# Patient Record
Sex: FEMALE | Race: OTHER | Employment: STUDENT | URBAN - METROPOLITAN AREA
[De-identification: names, ages, dates, MRNs, and addresses within clinical notes are randomized per-mention and may not be internally consistent; named-entity substitution may affect disease eponyms.]

---

## 2020-01-15 ENCOUNTER — OFFICE VISIT (OUTPATIENT)
Dept: FAMILY MEDICINE CLINIC | Age: 20
End: 2020-01-15

## 2020-01-15 VITALS
HEART RATE: 63 BPM | OXYGEN SATURATION: 98 % | HEIGHT: 64 IN | TEMPERATURE: 98.4 F | DIASTOLIC BLOOD PRESSURE: 62 MMHG | WEIGHT: 186.4 LBS | RESPIRATION RATE: 18 BRPM | BODY MASS INDEX: 31.82 KG/M2 | SYSTOLIC BLOOD PRESSURE: 110 MMHG

## 2020-01-15 DIAGNOSIS — E66.09 CLASS 1 OBESITY DUE TO EXCESS CALORIES WITHOUT SERIOUS COMORBIDITY WITH BODY MASS INDEX (BMI) OF 32.0 TO 32.9 IN ADULT: ICD-10-CM

## 2020-01-15 DIAGNOSIS — E55.9 VITAMIN D DEFICIENCY: ICD-10-CM

## 2020-01-15 DIAGNOSIS — Z23 ENCOUNTER FOR IMMUNIZATION: ICD-10-CM

## 2020-01-15 DIAGNOSIS — E28.2 POLYCYSTIC OVARIES: Primary | ICD-10-CM

## 2020-01-15 PROBLEM — Z80.3 FAMILY HISTORY OF BREAST CANCER: Status: ACTIVE | Noted: 2018-03-02

## 2020-01-15 PROBLEM — E66.9 OBESITY: Status: ACTIVE | Noted: 2017-03-01

## 2020-01-15 PROBLEM — L68.0 HIRSUTISM: Status: ACTIVE | Noted: 2019-04-02

## 2020-01-15 PROBLEM — N92.6 IRREGULAR PERIODS: Status: ACTIVE | Noted: 2017-03-01

## 2020-01-15 PROBLEM — E16.1 HYPERINSULINISM: Status: ACTIVE | Noted: 2019-04-05

## 2020-01-15 PROBLEM — L83 ACANTHOSIS NIGRICANS: Status: ACTIVE | Noted: 2019-04-02

## 2020-01-15 RX ORDER — METFORMIN HYDROCHLORIDE 500 MG/1
TABLET, FILM COATED, EXTENDED RELEASE ORAL
COMMUNITY
End: 2022-02-02

## 2020-01-15 RX ORDER — NORETHINDRONE ACETATE AND ETHINYL ESTRADIOL 1; .02 MG/1; MG/1
TABLET ORAL
COMMUNITY

## 2020-01-15 RX ORDER — ASPIRIN 325 MG
TABLET, DELAYED RELEASE (ENTERIC COATED) ORAL
COMMUNITY

## 2020-01-15 NOTE — PROGRESS NOTES
Mother is long term patient here. Put on metformin for PCOS. Saw nutritionist.  Had labs done. On mod keto diet. Med[facility]weight loss clinic. Does cardio 2 x weekly 60 minutes. Visit Vitals  /62 (BP 1 Location: Right arm, BP Patient Position: Sitting)   Pulse 63   Temp 98.4 °F (36.9 °C) (Oral)   Resp 18   Ht 5' 3.74\" (1.619 m)   Wt 186 lb 6.4 oz (84.6 kg)   LMP 12/11/2019   SpO2 98%   BMI 32.26 kg/m²     Patient alert and cooperative. Reviewed above. Assessment:  1. Here for get acquainted. 2. Begun on Metformin for PCOS. 3. On vitamin D for replacement, on BCP, multivitamin. 4. GYN is up to date. Plan:  1. Get shot records and recently done labs. 2. Discussed routine health screening, physical timing. 3. Flu shot per request.  4. Follow otherwise here prn. TOTAL FACE TO FACE TIME OF 10 MINUTES SPENT WITH PATIENT. GREATER THAN 50% OF TIME WAS SPENT IN COUNSELING AND/OR COORDINATION OF CARE. SEE ASSESSMENT AND PLAN FOR DETAILS.

## 2020-01-15 NOTE — PROGRESS NOTES
Lisbeth Baldwin is a 22 y.o. female  HIPAA verified by two patient identifiers. Health Maintenance Due   Topic    HPV Age 9Y-34Y (3 - Female 2-dose series)    DTaP/Tdap/Td series (1 - Tdap)    Influenza Age 5 to Adult      Chief Complaint   Patient presents with   [facility] Patient     Visit Vitals  /62 (BP 1 Location: Right arm, BP Patient Position: Sitting)   Pulse 63   Temp 98.4 °F (36.9 °C) (Oral)   Resp 18   Ht 5' 3.74\" (1.619 m)   Wt 186 lb 6.4 oz (84.6 kg)   LMP 12/11/2019   SpO2 98%   BMI 32.26 kg/m²       Pain Scale: 0 - No pain/10  Pain Location:   1. Have you been to the ER, urgent care clinic since your last visit? Hospitalized since your last visit? No    2. Have you seen or consulted any other health care providers outside of the [address] [place] [address] since your last visit? Include any pap smears or colon screening.  No

## 2020-10-20 ENCOUNTER — VIRTUAL VISIT (OUTPATIENT)
Dept: FAMILY MEDICINE CLINIC | Age: 20
End: 2020-10-20
Payer: COMMERCIAL

## 2020-10-20 DIAGNOSIS — H69.81 ETD (EUSTACHIAN TUBE DYSFUNCTION), RIGHT: ICD-10-CM

## 2020-10-20 DIAGNOSIS — J30.89 SEASONAL ALLERGIC RHINITIS DUE TO OTHER ALLERGIC TRIGGER: ICD-10-CM

## 2020-10-20 DIAGNOSIS — H92.01 OTALGIA OF RIGHT EAR: ICD-10-CM

## 2020-10-20 DIAGNOSIS — R63.4 WEIGHT LOSS: ICD-10-CM

## 2020-10-20 DIAGNOSIS — H91.8X1 OTHER SPECIFIED HEARING LOSS OF RIGHT EAR, UNSPECIFIED HEARING STATUS ON CONTRALATERAL SIDE: Primary | ICD-10-CM

## 2020-10-20 PROCEDURE — 99213 OFFICE O/P EST LOW 20 MIN: CPT | Performed by: FAMILY MEDICINE

## 2020-10-20 RX ORDER — CHOLECALCIFEROL (VITAMIN D3) 125 MCG
CAPSULE ORAL
COMMUNITY

## 2020-10-20 NOTE — PATIENT INSTRUCTIONS
Allergies: Care Instructions Your Care Instructions Allergies occur when your body's defense system (immune system) overreacts to certain substances. The immune system treats a harmless substance as if it were a harmful germ or virus. Many things can make this happen. These include pollens, medicine, food, dust, animal dander, and mold. Allergies can be mild or severe. Mild allergies can be managed with home treatment. But medicine may be needed to prevent problems. Managing your allergies is an important part of staying healthy. Your doctor may suggest that you have allergy testing to help find out what is causing your allergies. Severe allergies can cause reactions that affect your whole body (anaphylactic reactions). Your doctor may prescribe a shot of epinephrine to carry with you in case you have a severe reaction. Learn how to give yourself the shot and keep it with you at all times. Make sure it is not . Follow-up care is a key part of your treatment and safety. Be sure to make and go to all appointments, and call your doctor if you are having problems. It's also a good idea to know your test results and keep a list of the medicines you take. How can you care for yourself at home? · If you have been told by your doctor that dust or dust mites are causing your allergy, decrease the dust around your bed: 
? Wash sheets, pillowcases, and other bedding in hot water every week. ? Use dust-proof covers for pillows, duvets, and mattresses. Avoid plastic covers because they tear easily and do not \"breathe. \" Wash as instructed on the label. ? Do not use any blankets and pillows that you do not need. ? Use blankets that you can wash in your washing machine. ? Consider removing drapes and carpets, which attract and hold dust, from your bedroom. · If you are allergic to house dust and mites, do not use home humidifiers. Your doctor can suggest ways you can control dust and mites. · Look for signs of cockroaches. Cockroaches cause allergic reactions. Use cockroach baits to get rid of them. Then, clean your home well. Cockroaches like areas where grocery bags, newspapers, empty bottles, or cardboard boxes are stored. Do not keep these inside your home, and keep trash and food containers sealed. Seal off any spots where cockroaches might enter your home. · If you are allergic to mold, get rid of furniture, rugs, and drapes that smell musty. Check for mold in the bathroom. · If you are allergic to outdoor pollen or mold spores, use air-conditioning. Change or clean all filters every month. Keep windows closed. · If you are allergic to pollen, stay inside when pollen counts are high. Use a vacuum  with a HEPA filter or a double-thickness filter at least two times each week. · Stay inside when air pollution is bad. Avoid paint fumes, perfumes, and other strong odors. · Avoid conditions that make your allergies worse. Stay away from smoke. Do not smoke or let anyone else smoke in your house. Do not use fireplaces or wood-burning stoves. · If you are allergic to your pets, change the air filter in your furnace every month. Use high-efficiency filters. · If you are allergic to pet dander, keep pets outside or out of your bedroom. Old carpet and cloth furniture can hold a lot of animal dander. You may need to replace them. When should you call for help? Give an epinephrine shot if: 
  · You think you are having a severe allergic reaction.  
  · You have symptoms in more than one body area, such as mild nausea and an itchy mouth. After giving an epinephrine shot call 911, even if you feel better. Call 911 if: 
  · You have symptoms of a severe allergic reaction. These may include: 
? Sudden raised, red areas (hives) all over your body. ? Swelling of the throat, mouth, lips, or tongue. ? Trouble breathing. ? Passing out (losing consciousness).  Or you may feel very lightheaded or suddenly feel weak, confused, or restless.  
  · You have been given an epinephrine shot, even if you feel better. Call your doctor now or seek immediate medical care if: 
  · You have symptoms of an allergic reaction, such as: ? A rash or hives (raised, red areas on the skin). ? Itching. ? Swelling. ? Belly pain, nausea, or vomiting. Watch closely for changes in your health, and be sure to contact your doctor if: 
  · You do not get better as expected. Where can you learn more? Go to http://www.gray.com/ Enter P957 in the search box to learn more about \"Allergies: Care Instructions. \" Current as of: June 29, 2020               Content Version: 12.6 © 6154-7080 [employer]. Care instructions adapted under license by [employer] (which disclaims liability or warranty for this information). If you have questions about a medical condition or this instruction, always ask your healthcare professional. [place] [address] any warranty or liability for your use of this information. Saline Nasal Washes: Care Instructions Your Care Instructions Saline nasal washes help keep the nasal passages open by washing out thick or dried mucus. This simple remedy can help relieve symptoms of allergies, sinusitis, and colds. It also can make the nose feel more comfortable by keeping the mucous membranes moist. You may notice a little burning sensation in your nose the first few times you use the solution, but this usually gets better in a few days. Follow-up care is a key part of your treatment and safety. Be sure to make and go to all appointments, and call your doctor if you are having problems. It's also a good idea to know your test results and keep a list of the medicines you take. How can you care for yourself at home?  
· You can buy premixed saline solution in a squeeze bottle or other sinus rinse products at a drugstore. Read and follow the instructions on the label. · You also can make your own saline solution by adding 1 teaspoon of salt and 1 teaspoon of baking soda to 2 cups of distilled water. · If you use a homemade solution, pour a small amount into a clean bowl. Using a rubber bulb syringe, squeeze the syringe and place the tip in the salt water. Pull a small amount of the salt water into the syringe by relaxing your hand. · Sit down with your head tilted slightly back. Do not lie down. Put the tip of the bulb syringe or the squeeze bottle a little way into one of your nostrils. Gently drip or squirt a few drops into the nostril. Repeat with the other nostril. Some sneezing and gagging are normal at first. 
· Gently blow your nose. · Wipe the syringe or bottle tip clean after each use. · Repeat this 2 or 3 times a day. · Use nasal washes gently if you have nosebleeds often. When should you call for help? Watch closely for changes in your health, and be sure to contact your doctor if: 
  · You often get nosebleeds.  
  · You have problems doing the nasal washes. Where can you learn more? Go to http://inés-mohit.info/ Enter [id number] in the search box to learn more about \"Saline Nasal Washes: Care Instructions. \" Current as of: April 15, 2020               Content Version: 12.6 © 2188-0709 [employer]. Care instructions adapted under license by [employer] (which disclaims liability or warranty for this information). If you have questions about a medical condition or this instruction, always ask your healthcare professional. [place] [address] any warranty or liability for your use of this information. Eustachian Tube Problems: Care Instructions Your Care Instructions The eustachian (say \"you-STAY-shee-un\") tubes run between the inside of the ears and the throat. They keep air pressure stable in the ears.  If your eustachian tubes become blocked, the air pressure in your ears changes. The fluids from a cold can clog eustachian tubes, causing pain in the ears. A quick change in air pressure can cause eustachian tubes to close up. This might happen when an airplane changes altitude or when a  goes up or down underwater. Eustachian tube problems often clear up on their own or after antibiotic treatment. If your tubes continue to be blocked, you may need surgery. Follow-up care is a key part of your treatment and safety. Be sure to make and go to all appointments, and call your doctor if you are having problems. It's also a good idea to know your test results and keep a list of the medicines you take. How can you care for yourself at home? · To ease ear pain, apply a warm washcloth or a heating pad set on low. There may be some drainage from the ear when the heat melts earwax. Put a cloth between the heat source and your skin. Do not use a heating pad with children. · If your doctor prescribed antibiotics, take them as directed. Do not stop taking them just because you feel better. You need to take the full course of antibiotics. · Your doctor may recommend over-the-counter medicine. Be safe with medicines. Oral or nasal decongestants may relieve ear pain. Avoid decongestants that are combined with antihistamines, which tend to cause more blockage. But if allergies seem to be the problem, your doctor may recommend a combination. Be careful with cough and cold medicines. Don't give them to children younger than 6, because they don't work for children that age and can even be harmful. For children 6 and older, always follow all the instructions carefully. Make sure you know how much medicine to give and how long to use it. And use the dosing device if one is included. When should you call for help? Call your doctor now or seek immediate medical care if: 
  · You develop sudden, complete hearing loss.   · You have severe pain or feel dizzy.  
  · You have new or increasing pus or blood draining from your ear.  
  · You have redness, swelling, or pain around or behind the ear. Watch closely for changes in your health, and be sure to contact your doctor if: 
  · You do not get better after 2 weeks.  
  · You have any new symptoms, such as itching or a feeling of fullness in the ear. Where can you learn more? Go to http://www.gray.com/ Enter Y822 in the search box to learn more about \"Eustachian Tube Problems: Care Instructions. \" Current as of: April 15, 2020               Content Version: 12.6 © 7859-5820 "[employer]. Care instructions adapted under license by [employer] (which disclaims liability or warranty for this information). If you have questions about a medical condition or this instruction, always ask your healthcare professional. [address] any warranty or liability for your use of this information. Advise patient to start taking Over The Counter allergy medication (Allegra, Zyrtec, Claritin, Xyzal or Alavert) daily. If you have itchy, watery eyes you can try OTC Zaditor or Zyrtec Allergy Eye drops. If you have a stuffy nose, please try OTC Flonase, Flonase Sensimist, Rhinocort, or Nasacort AQ 2 squirts up each nostril once a day (adults) or 1 squirt up each nostril once a day (children). Use allergy free, dye free, fragrance free products on your body and hair. After being outdoors, brush hair vigorously, wash face and arms, rinse nostrils with a nasal saline spray and consider changing your clothing. Dust furniture frequently and wear a mask while doing it. Vacuum floors weekly. Remove stuffed animals or extra pillows from the bed. Clean bedding in hot water weekly. Sometimes allergies can be so severe that 2 nasal sprays and 2 pills may be needed to control symptoms.

## 2020-10-20 NOTE — PROGRESS NOTES
Chief Complaint   Patient presents with    Ear Pain     right,muffled sound with sharp pain     1. Have you been to the ER, urgent care clinic since your last visit? Hospitalized since your last visit?no    2. Have you seen or consulted any other health care providers outside of the [address] [place] [address] since your last visit? Include any pap smears or colon screening.  no

## 2020-10-20 NOTE — PROGRESS NOTES
VIRTUAL VISIT      Pursuant to the emergency declaration under the [employer] and [facility], 1135 waiver authority and the [employer] and [employer] Act, this Virtual Visit was conducted, with patient's consent, to reduce the patient's risk of exposure to COVID-19 and provide continuity of care for an established patient. Services were provided through a video synchronous discussion virtually to substitute for in-person appointment. Consent:  She and/or her healthcare decision maker is aware that this patient-initiated Telehealth encounter is a billable service, with coverage as determined by her insurance carrier. She is aware that she may receive a bill and has provided verbal consent to proceed: Yes    HISTORY OF PRESENT ILLNESS  Ke Almeida is a 23 y.o. female presents with Ear Pain (right,muffled sound with sharp pain)      Agree with nurse note. Pt's PCP is Duyen Ambrose NP who is seeing me today as a courtesy for acute care. Pt with weight loss. She takes metformin 500 mg q7d for hyperinsulinism; tolerating well. Pt is down 21 lbs since 1/15/2020. She abides by the keto diet and walks daily. Pt with seasonal allergic rhinitis and ETD complains of sharp R ear pain that has occurred 3 times in the last month, occasional ear popping, sensitivity to sounds and vibrations, and occasional muffled hearing loss. No known head trauma. The pain lasts for 10 seconds at a time. She uses airpod headphones frequently. She takes Zyrtec for allergies with moderate relief. Pt denies pain with ear tugging, fever, chills, headaches, facial pain, cough, wheezing, sneezing, chest pain or tightness, SOB, nasal discharge, or post nasal drainage. Pt notes previously having similar sxs that lasted 1 month before sxs resolved.     Written by shae Schwab, as dictated by Dr. Jessica Barnett DO.    ROS    Review of Systems negative except as noted above in HPI. ALLERGIES:  No Known Allergies    CURRENT MEDICATIONS:    Outpatient Medications Marked as Taking for the 10/20/20 encounter (Virtual Visit) with Henrietta Emerson DO   Medication Sig Dispense Refill    cholecalciferol, vitamin D3, (Vitamin D3) 50 mcg (2,000 unit) tab Take  by mouth.  norethindrone-ethinyl estradiol (JUNEL 1/20, 21,) 1-20 mg-mcg tab Junel 1/20 (21) 1 mg-20 mcg tablet   TAKE 1 TABLET BY MOUTH EVERY DAY AT THE SAME TIME      multivit with min-folic acid (ADULT MULTIVITAMIN GUMMIES) 200 mcg chew Take  by mouth. PAST MEDICAL HISTORY:  No past medical history on file. PAST SURGICAL HISTORY:  No past surgical history on file. FAMILY HISTORY:    Family History   Problem Relation Age of Onset    Diabetes Father     Diabetes Paternal Uncle     Diabetes Maternal Grandmother     Arthritis-osteo Maternal Grandmother     Cancer Maternal Grandfather        SOCIAL HISTORY:    Social History     Socioeconomic History    Marital status: SINGLE     Spouse name: Not on file    Number of children: Not on file    Years of education: Not on file    Highest education level: Not on file   Tobacco Use    Smoking status: Never Smoker    Smokeless tobacco: Never Used   Substance and Sexual Activity    Drug use: Never    Sexual activity: Yes     Partners: Male       IMMUNIZATIONS:    Immunization History   Administered Date(s) Administered    Influenza Vaccine (Quad) PF (>6 Mo Flulaval, Fluarix, and >3 Yrs [address] [place] [address]) 01/15/2020         PHYSICAL EXAMINATION (PER VISUAL INSPECTION AND INSTRUCTIONS GIVEN TO PATIENT TO PERFORM)    Due to this being a TeleHealth encounter (During [facility]- public health emergency), evaluation of the following organ systems was limited to:     Vital Signs  There were no vitals taken for this visit. Weight Metrics 1/15/2020   Weight 186 lb 6.4 oz   BMI 32.26 kg/m2       General appearance - Well nourished. Well appearing.   Well developed. No acute distress. Obese. Head - Normocephalic. Atraumatic. Eyes - Extraocular eye movements intact. Sclera anicteric. Mildly injected sclera. Ears - Hearing is grossly normal bilaterally. Nose - normal and patent. No discharge. Chest - No visualized signs of difficulty breathing or respiratory distress. Neurological - awake, alert and oriented to person, place, and time and event. Cranial nerves II through XII intact. Clear speech. Musculoskeletal - Intact x 4 extremities. No pain with movement. Psychological -   normal behavior, dress and thought processes. Good insight. Good eye contact. Normal affect. Appropriate mood. Normal speech. ASSESSMENT and PLAN      ICD-10-CM ICD-9-CM    1. Other specified hearing loss of right ear, unspecified hearing status on contralateral side  H91.8X1 389.8 REFERRAL TO ENT-OTOLARYNGOLOGY   2. Seasonal allergic rhinitis due to other allergic trigger  J30.89 477.8    3. Otalgia of right ear  H92.01 388.70 REFERRAL TO ENT-OTOLARYNGOLOGY   4. ETD (Eustachian tube dysfunction), right  H69.81 381.81    5. Weight loss  R63.4 783.21     21# due to keto diet and increased walking       Discussed the patient's BMI with her. The BMI follow up plan is as follows: I have counseled this patient on diet and exercise regimens. Decrease carbohydrates (white foods, sweet foods, sweet drinks and alcohol), increase green leafy vegetables and protein (lean meats and beans) with each meal.  Avoid fried foods. Eat 3-5 small meals daily. Do not skip meals. Increase water intake. Increase physical activity to 30 minutes daily for health benefit or 60 minutes daily to prevent weight regain, as tolerated. Get 7-8 hours uninterrupted sleep nightly. Chart reviewed and updated. Continue current medications and care. Recommend pt use steroid nasal spray, such as Nasocort, Rhinocort, or Flonase Sensimist. Recommend OTC allergy medication daily. Recommend using an OTC ear wax removal kit  Referrals given; patient urged to keep appointments with specialists. ENT-Otolaryngology  Contact information for Dr. Renée Lyons given to pt today. Counseled patient on health concerns:  R ear pain  Offered empathy, support, legitimation, prayers, partnership to patient. Praised patient for progress. Encouraged the pt to sign up for MyChart to be able to view results and send me any questions or concerns prior to the next visit where we will go over results in detail. Patient was offered a choice/choices in the treatment plan today. Patient expresses understanding of the plan and agrees with recommendations. 13 mins spent face to face with patient and more than 50% of this time spent in counseling and coordinating care. Written by shae Lozano, as dictated by Dr. Chetan Michelle DO. Documentation True and Accepted by Nicola Hogan.  Christal Chavez.

## 2021-01-21 ENCOUNTER — TELEPHONE (OUTPATIENT)
Dept: FAMILY MEDICINE CLINIC | Age: 21
End: 2021-01-21

## 2021-01-21 NOTE — TELEPHONE ENCOUNTER
Informed pt per Marie VILLARREAL that she needs to be evaluated at an urgent care setting, to have her bs and bp and right ear evaluated. Informed pt to have someone drive her there and continue to drink plenty of fluids     Pt verbalized understanding of information discussed w/ no further questions at this time.

## 2021-01-21 NOTE — TELEPHONE ENCOUNTER
Pt states she passed out 20 mins due to a possible anxiety or panic attack. Asked pt about her diet and fluid intake and other sx . Pt states she did not eat yesterday and has not eaten or drink anything this morning. She states eight ear has been sensitive and sometimes vibrates after a workout and pops. Pt state she has been on the Keto diet and has PCOS and wonders if that could have contributed to her passing out     Informed pt that these all could have played a role in affecting her equilibrium, hormonal balance and electrolytes. Informed pt to rest and to see if she can check her bp, pulse and blood sugar levels, pt did not have any of the following at hand.      Informed pt to rest and eat and drink plenty fluids and will call back with further instructions

## 2021-02-24 ENCOUNTER — OFFICE VISIT (OUTPATIENT)
Dept: NEUROLOGY | Age: 21
End: 2021-02-24
Payer: COMMERCIAL

## 2021-02-24 VITALS
BODY MASS INDEX: 30.48 KG/M2 | SYSTOLIC BLOOD PRESSURE: 112 MMHG | WEIGHT: 172 LBS | OXYGEN SATURATION: 100 % | HEART RATE: 61 BPM | TEMPERATURE: 96.8 F | DIASTOLIC BLOOD PRESSURE: 60 MMHG | HEIGHT: 63 IN

## 2021-02-24 DIAGNOSIS — M79.2 NEURITIS: Primary | ICD-10-CM

## 2021-02-24 DIAGNOSIS — R20.2 PARESTHESIA: ICD-10-CM

## 2021-02-24 PROCEDURE — 99205 OFFICE O/P NEW HI 60 MIN: CPT | Performed by: PSYCHIATRY & NEUROLOGY

## 2021-02-24 NOTE — PROGRESS NOTES
1/22 blood drawn numbness left fore arm after blood draw  Back of the arm  Finish up the steroid  Neurology Consult Note      HISTORY PROVIDED BY: patient    Chief Complaint:   Chief Complaint   Patient presents with    New Patient      Subjective:    Saroj Berrios is a 23 y.o. right handed female who presents in consultation for numbness and tingling left forearm. Patient was accompanied by her father to the visit. This is a 22-year-old right-handed female with no apparent past medical history except for fainting spell who presented to the clinic to evaluate numbness, tingling sensation and pain left forearm after phlebotomy. Patient apparently had blackout spell on or about 1/22/2021, [facility], according to patient, when the phlebotomist stuck her with needle to get  blood she felt sensation that went down to left forearm, left forearm became tingly, painful, was unable to move the fingers or make a fist.  She said she went back and was given medication which did not help, subsequently, she was given methylprednisolone which according to patient, it has been helpful. The sensation has decreased, pain has also decreased, she is able to move the left hand and forearm better. She is not dropping things.   Review of Systems - General ROS: negative for - fatigue or sleep disturbance  Psychological ROS: negative for - anxiety or sleep disturbances  Ophthalmic ROS: negative for - blurry vision or decreased vision  ENT ROS: negative for - headaches, tinnitus, vertigo or visual changes  Allergy and Immunology ROS: negative  Hematological and Lymphatic ROS: negative  Endocrine ROS: negative  Respiratory ROS: no cough, shortness of breath, or wheezing  Cardiovascular ROS: no chest pain or dyspnea on exertion  Gastrointestinal ROS: no abdominal pain, change in bowel habits, or black or bloody stools  Genito-Urinary ROS: no dysuria, trouble voiding, or hematuria  Musculoskeletal ROS: positive for - muscular weakness and pain in arm - left, hand - left and wrist - left  Neurological ROS: negative for - dizziness or gait disturbance. Positive for-numbness and tingling sensation left arm l  Dermatological ROS: negative    History reviewed. No pertinent past medical history. History reviewed. No pertinent surgical history.    Social History     Socioeconomic History    Marital status: SINGLE     Spouse name: Not on file    Number of children: Not on file    Years of education: Not on file    Highest education level: Not on file   Occupational History    Not on file   Social Needs    Financial resource strain: Not on file    Food insecurity     Worry: Not on file     Inability: Not on file    Transportation needs     Medical: Not on file     Non-medical: Not on file   Tobacco Use    Smoking status: Never Smoker    Smokeless tobacco: Never Used   Substance and Sexual Activity    Alcohol use: Not on file    Drug use: Never    Sexual activity: Yes     Partners: Male   Lifestyle    Physical activity     Days per week: Not on file     Minutes per session: Not on file    Stress: Not on file   Relationships    Social connections     Talks on phone: Not on file     Gets together: Not on file     Attends Anabaptism service: Not on file     Active member of club or organization: Not on file     Attends meetings of clubs or organizations: Not on file     Relationship status: Not on file    Intimate partner violence     Fear of current or ex partner: Not on file     Emotionally abused: Not on file     Physically abused: Not on file     Forced sexual activity: Not on file   Other Topics Concern    Not on file   Social History Narrative    Not on file     Family History   Problem Relation Age of Onset    Diabetes Father     Diabetes Paternal Uncle     Diabetes Maternal Grandmother     Arthritis-osteo Maternal Grandmother     Cancer Maternal Grandfather          Objective:   ROS  As per HPI  No Known Allergies     Meds:  Outpatient Medications Prior to Visit   Medication Sig Dispense Refill    cholecalciferol, vitamin D3, (Vitamin D3) 50 mcg (2,000 unit) tab Take  by mouth.  norethindrone-ethinyl estradiol (JUNEL 1/20, 21,) 1-20 mg-mcg tab Junel 1/20 (21) 1 mg-20 mcg tablet   TAKE 1 TABLET BY MOUTH EVERY DAY AT THE SAME TIME      multivit with min-folic acid (ADULT MULTIVITAMIN GUMMIES) 200 mcg chew Take  by mouth.  cholecalciferol (VITAMIN D3) (50,000 UNITS /1250 MCG) capsule cholecalciferol (vitamin D3) 1,250 mcg (50,000 unit) capsule   Take 1 capsule every week by oral route.  metFORMIN (GLUMETZA ER) 500 mg TG24 24 hour tablet metformin  mg tablet,extended release 24 hr   one tablet after dinner x 1 week. If tolerating add second dose after breakfast.       No facility-administered medications prior to visit. Imaging:  MRI Results (most recent):  No results found for this or any previous visit. CT Results (most recent):  No results found for this or any previous visit. Reviewed records in [facility] tab today    Lab Review   No results found for this or any previous visit. Exam:  Visit Vitals  /60   Pulse 61   Temp 96.8 °F (36 °C)   Ht 5' 3\" (1.6 m)   Wt 172 lb (78 kg)   SpO2 100%   BMI 30.47 kg/m²     General:  Alert, cooperative, no distress. Head:  Normocephalic, without obvious abnormality, atraumatic. Respiratory:  Heart:   Non labored breathing  Regular rate and rhythm, no murmurs   Neck:   2+ carotids, no bruits   Extremities: Warm, no cyanosis or edema. Pulses: 2+ radial pulses. Neurologic:  MS: Alert and oriented x 4, speech intact. Language intact, able to name, repeat, and follow all commands. Attention and fund of knowledge appropriate. Recent and remote memory intact.   Cranial Nerves:  II: visual fields Full to confrontation   II: pupils Equal, round, reactive to light   II: optic disc No papilledema   III,VII: ptosis none   III,IV,VI: extraocular muscles  EOMI, no nystagmus or diplopia   V: facial light touch sensation  normal   VII: facial muscle function   symmetric   VIII: hearing intact   IX: soft palate elevation  normal   XI: trapezius strength  5/5   XI: sternocleidomastoid strength 5/5   XII: tongue  Midline     Motor: normal bulk and tone, no tremor              Strength: 4+/5 left hand, [place] 5 rest of the extremities , no PD  Sensory: Equivocal decreased sensation to LT, PP, temperature and vibration left forearm  Coordination: FTN and HTS intact, CAM intact,Romberg negative  Gait: normal gait, able to heel, toe, and tandem walk  Reflexes: 2+ symmetric. Plantar: Withdrawn           Assessment/Plan   This is a 23-year-old right-handed female with no apparent past medical history who is being evaluated for numbness and tingling sensation, pain left forearm and hand status post phlebotomy. ICD-10-CM ICD-9-CM    1. Neuritis  M79.2 729.2    2. Paresthesia  R20.2 782.0    Plan:  Patient to continue and finish up the steroid. At this time, no other medications recommended as patient is improving. If symptoms do not resolve by next 2 months, patient advised to come back to the clinic for nerve conduction study and further evaluation. With incident less than 6 weeks, nerve conduction study may not yield any meaningful. Results    Follow-up and Dispositions    · Return if symptoms worsen or fail to improve. Thank you very much for this consultation.    Signed:  Justine Wadsworth MD  2/24/2021

## 2022-01-05 ENCOUNTER — OFFICE VISIT (OUTPATIENT)
Dept: URGENT CARE | Age: 22
End: 2022-01-05
Payer: COMMERCIAL

## 2022-01-05 VITALS — TEMPERATURE: 98.2 F | HEART RATE: 84 BPM | RESPIRATION RATE: 18 BRPM | OXYGEN SATURATION: 98 %

## 2022-01-05 DIAGNOSIS — Z20.822 SUSPECTED COVID-19 VIRUS INFECTION: Primary | ICD-10-CM

## 2022-01-05 PROCEDURE — S9083 URGENT CARE CENTER GLOBAL: HCPCS | Performed by: FAMILY MEDICINE

## 2022-01-05 NOTE — PROGRESS NOTES
This patient was seen at [address] [place] [address] Urgent Care while in their vehicle due to COVID-19 pandemic with PPE and focused examination in order to decrease community viral transmission. The patient/guardian gave verbal consent to treat. The history is provided by the patient. Sore Throat   The history is provided by the patient. This is a new problem. The current episode started 12 to 24 hours ago. The problem has not changed since onset. There has been no fever. Associated symptoms include congestion. She has had no exposure to strep or mono. She has tried nothing for the symptoms. History reviewed. No pertinent past medical history. History reviewed. No pertinent surgical history. Family History   Problem Relation Age of Onset    Diabetes Father     Diabetes Paternal Uncle     Diabetes Maternal Grandmother     OSTEOARTHRITIS Maternal Grandmother     Cancer Maternal Grandfather         Social History     Socioeconomic History    Marital status: SINGLE     Spouse name: Not on file    Number of children: Not on file    Years of education: Not on file    Highest education level: Not on file   Occupational History    Not on file   Tobacco Use    Smoking status: Never Smoker    Smokeless tobacco: Never Used   Substance and Sexual Activity    Alcohol use: Not on file    Drug use: Never    Sexual activity: Yes     Partners: Male   Other Topics Concern    Not on file   Social History Narrative    Not on file     Social Determinants of Health     Financial Resource Strain:     Difficulty of Paying Living Expenses: Not on file   Food Insecurity:     Worried About Running Out of Food in the Last Year: Not on file    Shanelle of Food in the Last Year: Not on file   Transportation Needs:     Lack of Transportation (Medical): Not on file    Lack of Transportation (Non-Medical):  Not on file   Physical Activity:     Days of Exercise per Week: Not on file    Minutes of Exercise per Session: Not on file   Stress:     Feeling of Stress : Not on file   Social Connections:     Frequency of Communication with Friends and Family: Not on file    Frequency of Social Gatherings with Friends and Family: Not on file    Attends Pentecostal Services: Not on file    Active Member of Clubs or Organizations: Not on file    Attends Club or Organization Meetings: Not on file    Marital Status: Not on file   Intimate Partner Violence:     Fear of Current or Ex-Partner: Not on file    Emotionally Abused: Not on file    Physically Abused: Not on file    Sexually Abused: Not on file   Housing Stability:     Unable to Pay for Housing in the Last Year: Not on file    Number of [place] in the Last Year: Not on file    Unstable Housing in the Last Year: Not on file                ALLERGIES: Patient has no known allergies. Review of Systems   HENT: Positive for congestion and sore throat. All other systems reviewed and are negative. Vitals:    01/05/22 1308   Pulse: 84   Resp: 18   Temp: 98.2 °F (36.8 °C)   SpO2: 98%       Physical Exam  Vitals and nursing note reviewed. Constitutional:       General: She is not in acute distress. Appearance: She is not ill-appearing. Pulmonary:      Effort: Pulmonary effort is normal. No respiratory distress. Breath sounds: No wheezing. Genitourinary:     Rectum: Normal.         MDM    Procedures      ICD-10-CM ICD-9-CM    1. Suspected COVID-19 virus infection  Z20.822 V01.79 NOVEL CORONAVIRUS (COVID-19)     No orders of the defined types were placed in this encounter. No results found for any visits on 01/05/22. The patients condition was discussed with the patient and they understand. The patient is to follow up with primary care doctor. If signs and symptoms become worse the pt is to go to the ER. The patient is to take medications as prescribed.

## 2022-01-08 LAB
SARS-COV-2, NAA 2 DAY TAT: NORMAL
SARS-COV-2, NAA: DETECTED

## 2022-02-02 ENCOUNTER — OFFICE VISIT (OUTPATIENT)
Dept: FAMILY MEDICINE CLINIC | Age: 22
End: 2022-02-02
Payer: COMMERCIAL

## 2022-02-02 VITALS
HEART RATE: 71 BPM | DIASTOLIC BLOOD PRESSURE: 74 MMHG | HEIGHT: 63 IN | BODY MASS INDEX: 34.2 KG/M2 | SYSTOLIC BLOOD PRESSURE: 127 MMHG | RESPIRATION RATE: 18 BRPM | WEIGHT: 193 LBS | OXYGEN SATURATION: 99 % | TEMPERATURE: 97.7 F

## 2022-02-02 DIAGNOSIS — Z00.00 ANNUAL PHYSICAL EXAM: Primary | ICD-10-CM

## 2022-02-02 DIAGNOSIS — Z13.220 SCREENING CHOLESTEROL LEVEL: ICD-10-CM

## 2022-02-02 DIAGNOSIS — Z11.59 NEED FOR HEPATITIS C SCREENING TEST: ICD-10-CM

## 2022-02-02 DIAGNOSIS — E66.9 OBESITY (BMI 30.0-34.9): ICD-10-CM

## 2022-02-02 LAB
ALBUMIN SERPL-MCNC: 3.9 G/DL (ref 3.5–5)
ALBUMIN/GLOB SERPL: 1.1 {RATIO} (ref 1.1–2.2)
ALP SERPL-CCNC: 50 U/L (ref 45–117)
ALT SERPL-CCNC: 21 U/L (ref 12–78)
ANION GAP SERPL CALC-SCNC: 3 MMOL/L (ref 5–15)
AST SERPL-CCNC: 13 U/L (ref 15–37)
BILIRUB SERPL-MCNC: 0.2 MG/DL (ref 0.2–1)
BUN SERPL-MCNC: 12 MG/DL (ref 6–20)
BUN/CREAT SERPL: 16 (ref 12–20)
CALCIUM SERPL-MCNC: 9.5 MG/DL (ref 8.5–10.1)
CHLORIDE SERPL-SCNC: 107 MMOL/L (ref 97–108)
CHOLEST SERPL-MCNC: 145 MG/DL
CO2 SERPL-SCNC: 26 MMOL/L (ref 21–32)
CREAT SERPL-MCNC: 0.75 MG/DL (ref 0.55–1.02)
ERYTHROCYTE [DISTWIDTH] IN BLOOD BY AUTOMATED COUNT: 13 % (ref 11.5–14.5)
GLOBULIN SER CALC-MCNC: 3.6 G/DL (ref 2–4)
GLUCOSE SERPL-MCNC: 98 MG/DL (ref 65–100)
HCT VFR BLD AUTO: 40.6 % (ref 35–47)
HCV AB SERPL QL IA: NONREACTIVE
HDLC SERPL-MCNC: 58 MG/DL
HDLC SERPL: 2.5 {RATIO} (ref 0–5)
HGB BLD-MCNC: 13.4 G/DL (ref 11.5–16)
LDLC SERPL CALC-MCNC: 68.4 MG/DL (ref 0–100)
MCH RBC QN AUTO: 28.9 PG (ref 26–34)
MCHC RBC AUTO-ENTMCNC: 33 G/DL (ref 30–36.5)
MCV RBC AUTO: 87.5 FL (ref 80–99)
NRBC # BLD: 0 K/UL (ref 0–0.01)
NRBC BLD-RTO: 0 PER 100 WBC
PLATELET # BLD AUTO: 269 K/UL (ref 150–400)
PMV BLD AUTO: 9.9 FL (ref 8.9–12.9)
POTASSIUM SERPL-SCNC: 4.2 MMOL/L (ref 3.5–5.1)
PROT SERPL-MCNC: 7.5 G/DL (ref 6.4–8.2)
RBC # BLD AUTO: 4.64 M/UL (ref 3.8–5.2)
SODIUM SERPL-SCNC: 136 MMOL/L (ref 136–145)
TRIGL SERPL-MCNC: 93 MG/DL (ref ?–150)
VLDLC SERPL CALC-MCNC: 18.6 MG/DL
WBC # BLD AUTO: 7.1 K/UL (ref 3.6–11)

## 2022-02-02 PROCEDURE — 99395 PREV VISIT EST AGE 18-39: CPT | Performed by: STUDENT IN AN ORGANIZED HEALTH CARE EDUCATION/TRAINING PROGRAM

## 2022-02-02 RX ORDER — HYDROXYZINE 25 MG/1
TABLET, FILM COATED ORAL
COMMUNITY

## 2022-02-02 NOTE — PROGRESS NOTES
[facility]  [address] [place], [address] [place] [address]  [phone]    HPI:  Felicia Rodriguez is a 19 y.o. female. Presenting for her annual checkup. Previously following at [facility]    Acute/chronic concerns:   None    Other Health Habits and social history:  Smoking history: no  Alcohol history: socially   Occupation: senior in 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 Plans to go to grad school   Marital status:single and no children. Mother is Keyla Speaker who is also my patient. OB/GYN Hx: Follows with OB. On birth control    Health Maintenance:    Health Maintenance Due   Topic Date Due    Hepatitis C Screening  Never done    HPV Age 9Y-34Y (2 - 3-dose series) 03/30/2018    DTaP/Tdap/Td series (1 - Tdap) Never done    Pap Smear  Never done    Flu Vaccine (1) 09/01/2021       Current Outpatient Medications   Medication Sig Dispense Refill    norethindrone-ethinyl estradiol (JUNEL 1/20, 21,) 1-20 mg-mcg tab Junel 1/20 (21) 1 mg-20 mcg tablet   TAKE 1 TABLET BY MOUTH EVERY DAY AT THE SAME TIME      multivit with min-folic acid (ADULT MULTIVITAMIN GUMMIES) 200 mcg chew Take  by mouth.  hydrOXYzine HCL (ATARAX) 25 mg tablet hydroxyzine HCl 25 mg tablet   Take 1 tablet 3 times a day by oral route.  cholecalciferol, vitamin D3, (Vitamin D3) 50 mcg (2,000 unit) tab Take  by mouth. (Patient not taking: Reported on 2/2/2022)      cholecalciferol (VITAMIN D3) (50,000 UNITS /1250 MCG) capsule cholecalciferol (vitamin D3) 1,250 mcg (50,000 unit) capsule   Take 1 capsule every week by oral route. (Patient not taking: Reported on 2/2/2022)         Allergies:   Patient has no known allergies.      Social History     Socioeconomic History    Marital status: SINGLE     Spouse name: Not on file    Number of children: Not on file    Years of education: Not on file    Highest education level: Not on file   Occupational History    Not on file   Tobacco Use  Smoking status: Never Smoker    Smokeless tobacco: Never Used   Vaping Use    Vaping Use: Never used   Substance and Sexual Activity    Alcohol use: Not on file    Drug use: Never    Sexual activity: Yes     Partners: Male   Other Topics Concern    Not on file   Social History Narrative    Not on file     Social Determinants of Health     Financial Resource Strain:     Difficulty of Paying Living Expenses: Not on file   Food Insecurity:     Worried About Running Out of Food in the Last Year: Not on file    Shanelle of Food in the Last Year: Not on file   Transportation Needs:     Lack of Transportation (Medical): Not on file    Lack of Transportation (Non-Medical):  Not on file   Physical Activity:     Days of Exercise per Week: Not on file    Minutes of Exercise per Session: Not on file   Stress:     Feeling of Stress : Not on file   Social Connections:     Frequency of Communication with Friends and Family: Not on file    Frequency of Social Gatherings with Friends and Family: Not on file    Attends Amish Services: Not on file    Active Member of [address] [place] [address] or Organizations: Not on file    Attends Club or Organization Meetings: Not on file    Marital Status: Not on file   Intimate Partner Violence:     Fear of Current or Ex-Partner: Not on file    Emotionally Abused: Not on file    Physically Abused: Not on file    Sexually Abused: Not on file   Housing Stability:     Unable to Pay for Housing in the Last Year: Not on file    Number of [place] in the Last Year: Not on file    Unstable Housing in the Last Year: Not on file       Family History   Problem Relation Age of Onset    Diabetes Father     Diabetes Paternal Uncle     Diabetes Maternal Grandmother     OSTEOARTHRITIS Maternal Grandmother     Cancer Maternal Grandfather        Patient Active Problem List   Diagnosis Code    Polycystic ovaries E28.2    Acanthosis nigricans L83    Family history of breast cancer Z80.3  Hirsutism L68.0    Hyperinsulinism E16.1    Irregular periods N92.6    Obesity E66.9    Vitamin D deficiency E55.9       ROS:      A comprehensive review of systems was negative except for that written in the History of Present Illness. Physical Exam:  Visit Vitals  /74 (BP 1 Location: Right arm, BP Patient Position: Sitting, BP Cuff Size: Adult)   Pulse 71   Temp 97.7 °F (36.5 °C) (Oral)   Resp 18   Ht 5' 3\" (1.6 m)   Wt 193 lb (87.5 kg)   LMP 01/22/2022   SpO2 99%   BMI 34.19 kg/m²     General appearance: alert, cooperative, no distress, appears stated age  Head: Normocephalic, without obvious abnormality, atraumatic, sinuses nontender to percussion  Eyes: conjunctivae/corneas clear. PERRL, EOM's intact. Ears: normal TM's and external ear canals AU  Nose: Nares normal. Septum midline. Mucosa normal. No drainage or sinus tenderness. Throat: Lips, mucosa, and tongue normal. Teeth and gums normal  Neck: supple, symmetrical, trachea midline, no adenopathy, thyroid: not enlarged, symmetric, no tenderness/mass/nodules, no carotid bruit and no JVD  Back: symmetric, no curvature. ROM normal. No CVA tenderness. Lungs: clear to auscultation bilaterally, no wheezes, no increased work of breathing  Heart: regular rate and rhythm, S1, S2 normal, no murmur, click, rub or gallop  Abdomen: soft, non-tender. Bowel sounds normal. No masses,  no organomegaly  Extremities: extremities normal, atraumatic, no cyanosis or edema  Pulses: 2+ and symmetric  Skin: Skin color, texture, turgor normal. No rashes or lesions  Lymph nodes: Cervical, supraclavicular, and axillary nodes normal.  Neurologic: Alert and oriented X 3, normal strength and tone. Normal symmetric reflexes.  Normal coordination and gait    Depression screening:  3 most recent PHQ Screens 2/2/2022   Little interest or pleasure in doing things Not at all   Feeling down, depressed, irritable, or hopeless Not at all   Total Score PHQ 2 0   Trouble falling or staying asleep, or sleeping too much Not at all   Feeling tired or having little energy Not at all   Poor appetite, weight loss, or overeating Not at all   Feeling bad about yourself - or that you are a failure or have let yourself or your family down Not at all   Trouble concentrating on things such as school, work, reading, or watching TV Not at all   Moving or speaking so slowly that other people could have noticed; or the opposite being so fidgety that others notice Not at all   Thoughts of being better off dead, or hurting yourself in some way Not at all   PHQ 9 Score 0   How difficult have these problems made it for you to do your work, take care of your home and get along with others Not difficult at all         Assessment and Plan:    ICD-10-CM ICD-9-CM    1. Annual physical exam  Z00.00 V70.0    2. Obesity (BMI 30.0-34. 9)  K67.1 241.90 METABOLIC PANEL, COMPREHENSIVE      CBC W/O DIFF   3. Need for hepatitis C screening test  Z11.59 V73.89 HEPATITIS C AB   4. Screening cholesterol level  Z13.220 V77.91 LIPID PANEL       1. Annual physical exam    I have discussed with pt the following topics:   - Discussed with patient the cancer risk factors and recommendations  - Reviewed diet, exercise and weight control  -Immunizations appropriate for age were discussed with patient and updated   - Recommended sodium restriction  - Reviewed medications and side effects in detail    2. Obesity (BMI 26.8-31.3)  - METABOLIC PANEL, COMPREHENSIVE; Future  - CBC W/O DIFF; Future  - I counseled on lifestyle changes; discussed of the importance of eating habits/patterns and physical activity to overall health. Also discussed the importance to avoid smoking and excessive alcohol consumption.  - Encouraged to eat foods that are baked, broiled, boiled, steamed or grilled. Avoid greasy or fried foods. Use olive oil or canola oil instead of vegetable oil. Eat fish twice weekly.  Decreasing weight by 5-10% of baseline weight over the next 6 months if overweight/obese helps to improve obesity-related conditions. Eat a low carbohydrate diet. Decrease sugary beverages, white foods and sweets. Increase exercise to 5 days weekly for 30 minutes daily minimally and as tolerated. Have at an average of 7 hours of uninterrupted sleep at night. 3. Need for hepatitis C screening test  - HEPATITIS C AB; Future    4. Screening cholesterol level  - LIPID PANEL; Future    Follow up: 1 year. RTC to clinic sooner if needed    We discussed the expected course, resolution and complications of the diagnosis(es) in detail. Medication risks, benefits, costs, interactions, and alternatives were discussed as indicated. I advised to contact the office if his condition worsens, changes or fails to improve as anticipated. Pt expressed understanding with the diagnosis(es) and plan. Patient understands that this encounter was a temporary measure, and the importance of further follow up and examination was emphasized. Patient verbalized understanding.       Signed By: Eliu Mcnally MD     February 2, 2022

## 2022-02-02 NOTE — PROGRESS NOTES
Name and  Verified. Previous PCP: [facility] (Dr. Derick Cm)    Pharmacy verified    Chief Complaint   Patient presents with   [facility] New Patient    Establish Care     Attending [employer] for Social Working. Currently doing Internship. 1. Have you been to the ER, urgent care clinic since your last visit? Hospitalized since your last visit? Yes  2022  COVID Suspected POSITVE  [facility]    2. Have you seen or consulted any other health care providers outside of the [address] [place] [address] since your last visit? Include any pap smears or colon screening.  No      Health Maintenance Due   Topic Date Due    Hepatitis C Screening  Never done    HPV Age 9Y-34Y (2 - 3-dose series) 2018    DTaP/Tdap/Td series (1 - Tdap) Never done    Pap Smear  Never done    Flu Vaccine (1) 2021     Weighing Options to decide Flu Vaccine    Dr. Kurtis Goltz OBGYN Pap 2021

## 2022-03-18 PROBLEM — Z80.3 FAMILY HISTORY OF BREAST CANCER: Status: ACTIVE | Noted: 2018-03-02

## 2022-03-18 PROBLEM — L68.0 HIRSUTISM: Status: ACTIVE | Noted: 2019-04-02

## 2022-03-18 PROBLEM — N92.6 IRREGULAR PERIODS: Status: ACTIVE | Noted: 2017-03-01

## 2022-03-19 PROBLEM — L83 ACANTHOSIS NIGRICANS: Status: ACTIVE | Noted: 2019-04-02

## 2022-03-19 PROBLEM — E55.9 VITAMIN D DEFICIENCY: Status: ACTIVE | Noted: 2019-04-05

## 2022-03-19 PROBLEM — E28.2 POLYCYSTIC OVARIES: Status: ACTIVE | Noted: 2019-04-02

## 2022-03-19 PROBLEM — E66.9 OBESITY: Status: ACTIVE | Noted: 2017-03-01

## 2022-03-20 PROBLEM — E16.1 HYPERINSULINISM: Status: ACTIVE | Noted: 2019-04-05

## 2022-05-27 ENCOUNTER — TELEPHONE (OUTPATIENT)
Dept: FAMILY MEDICINE CLINIC | Age: 22
End: 2022-05-27

## 2022-05-27 NOTE — TELEPHONE ENCOUNTER
----- Message from Desi Vu sent at 5/27/2022 10:27 AM EDT -----  Subject: Message to Provider    QUESTIONS  Information for Provider? please call patient would like to set up a flu   shot appt   ---------------------------------------------------------------------------  --------------  CALL BACK INFO  What is the best way for the office to contact you? OK to leave message on   voicemail  Preferred Call Back Phone Number? [phone]  ---------------------------------------------------------------------------  --------------  SCRIPT ANSWERS  Relationship to Patient?  Self

## 2022-06-09 ENCOUNTER — TELEPHONE (OUTPATIENT)
Dept: FAMILY MEDICINE CLINIC | Age: 22
End: 2022-06-09

## 2022-06-09 NOTE — TELEPHONE ENCOUNTER
Patient has record of having a record of the first vaccine 9/20/2001 but no record of the second vaccine. Will consult with  Dr. Khushbu Rock to see if immunity needs to checked before second vaccine is administered. Patient acknowledge.

## 2022-06-09 NOTE — TELEPHONE ENCOUNTER
Returned call to patient per Dr. Pamella Smith patient is to come in for lab only to check for immunity. Patient coming in Monday 6/13/2022 between 8:30-3:30 pm. Orders will placed in [address] [place] [address].

## 2022-06-09 NOTE — TELEPHONE ENCOUNTER
Patient would like a call from nurse regarding getting a chicken pox shot she can be reached @ [phone]

## 2022-06-13 ENCOUNTER — OFFICE VISIT (OUTPATIENT)
Dept: FAMILY MEDICINE CLINIC | Age: 22
End: 2022-06-13

## 2022-06-13 DIAGNOSIS — Z01.84 IMMUNITY STATUS TESTING: Primary | ICD-10-CM

## 2022-06-15 LAB — VZV IGG SER IA-ACNC: <135 INDEX

## 2022-06-20 ENCOUNTER — TELEPHONE (OUTPATIENT)
Dept: FAMILY MEDICINE CLINIC | Age: 22
End: 2022-06-20

## 2022-06-20 NOTE — TELEPHONE ENCOUNTER
Per Dr. Luis Eduardo Lockhart:  Please inform patient she is not immune. We can administer the vaccine if available. Informed patient that she schedule a Nurse only visit to receive the vaccine. Patient acknowledge and appt.  for vaccine only 6/30/2022

## 2022-06-20 NOTE — TELEPHONE ENCOUNTER
----- Message from Mary Beckman MD sent at 6/19/2022 10:53 PM EDT -----  VZV non-immune. Please inform patient she is not immune.  We can administer the vaccine if available

## 2022-06-20 NOTE — PROGRESS NOTES
VZV non-immune. Please inform patient she is not immune.  We can administer the vaccine if available

## 2022-06-30 ENCOUNTER — OFFICE VISIT (OUTPATIENT)
Dept: FAMILY MEDICINE CLINIC | Age: 22
End: 2022-06-30
Payer: COMMERCIAL

## 2022-06-30 DIAGNOSIS — Z23 ENCOUNTER FOR IMMUNIZATION: Primary | ICD-10-CM

## 2022-06-30 PROCEDURE — 90716 VAR VACCINE LIVE SUBQ: CPT | Performed by: STUDENT IN AN ORGANIZED HEALTH CARE EDUCATION/TRAINING PROGRAM

## 2022-06-30 NOTE — PROGRESS NOTES
Name and  Verified. After obtaining consent, and per orders of Dr. Romana Belling, injection of VARICELLA, VARIVAX, Right Deltoid given by Brittanie. Patient instructed to remain in clinic for 20 minutes afterwards, and to report any adverse reaction to me immediately. Patient has documentation of first varicella given. Required by employer to receive 2nd vaccine. Varicella titer performed patient did not show immunity per Dr. Romana Belling.

## 2023-05-31 ENCOUNTER — TELEPHONE (OUTPATIENT)
Age: 23
End: 2023-05-31

## 2023-05-31 NOTE — TELEPHONE ENCOUNTER
Spoke with Benton Troncoso patient will be added on as a double book 8:40 am Virtual only. for POSITIVE Covid home test. pt complaining of abdominal pain, nausea, vomiting blood and tremors. history of alcohol abuse. states he drink vodka today. hr 131 at triage.

## 2023-05-31 NOTE — TELEPHONE ENCOUNTER
Patient wants to know if you would call something in for covid.   Please give her a call @ [phone]

## 2023-06-01 ENCOUNTER — TELEMEDICINE (OUTPATIENT)
Age: 23
End: 2023-06-01
Payer: COMMERCIAL

## 2023-06-01 DIAGNOSIS — U07.1 COVID-19: Primary | ICD-10-CM

## 2023-06-01 PROCEDURE — 99213 OFFICE O/P EST LOW 20 MIN: CPT | Performed by: STUDENT IN AN ORGANIZED HEALTH CARE EDUCATION/TRAINING PROGRAM

## 2023-06-01 SDOH — ECONOMIC STABILITY: HOUSING INSECURITY
IN THE LAST 12 MONTHS, WAS THERE A TIME WHEN YOU DID NOT HAVE A STEADY PLACE TO SLEEP OR SLEPT IN A SHELTER (INCLUDING NOW)?: NO

## 2023-06-01 SDOH — HEALTH STABILITY: PHYSICAL HEALTH: ON AVERAGE, HOW MANY MINUTES DO YOU ENGAGE IN EXERCISE AT THIS LEVEL?: 40 MIN

## 2023-06-01 SDOH — ECONOMIC STABILITY: HOUSING INSECURITY: IN THE LAST 12 MONTHS, HOW MANY PLACES HAVE YOU LIVED?: 0

## 2023-06-01 SDOH — ECONOMIC STABILITY: INCOME INSECURITY: IN THE LAST 12 MONTHS, WAS THERE A TIME WHEN YOU WERE NOT ABLE TO PAY THE MORTGAGE OR RENT ON TIME?: NO

## 2023-06-01 SDOH — ECONOMIC STABILITY: FOOD INSECURITY: WITHIN THE PAST 12 MONTHS, YOU WORRIED THAT YOUR FOOD WOULD RUN OUT BEFORE YOU GOT MONEY TO BUY MORE.: NEVER TRUE

## 2023-06-01 SDOH — ECONOMIC STABILITY: FOOD INSECURITY: WITHIN THE PAST 12 MONTHS, THE FOOD YOU BOUGHT JUST DIDN'T LAST AND YOU DIDN'T HAVE MONEY TO GET MORE.: NEVER TRUE

## 2023-06-01 SDOH — ECONOMIC STABILITY: INCOME INSECURITY: HOW HARD IS IT FOR YOU TO PAY FOR THE VERY BASICS LIKE FOOD, HOUSING, MEDICAL CARE, AND HEATING?: NOT HARD AT ALL

## 2023-06-01 SDOH — HEALTH STABILITY: PHYSICAL HEALTH: ON AVERAGE, HOW MANY DAYS PER WEEK DO YOU ENGAGE IN MODERATE TO STRENUOUS EXERCISE (LIKE A BRISK WALK)?: 5 DAYS

## 2023-06-01 ASSESSMENT — LIFESTYLE VARIABLES
HOW MANY STANDARD DRINKS CONTAINING ALCOHOL DO YOU HAVE ON A TYPICAL DAY: 1 OR 2
HOW OFTEN DO YOU HAVE A DRINK CONTAINING ALCOHOL: MONTHLY OR LESS

## 2023-06-01 ASSESSMENT — SOCIAL DETERMINANTS OF HEALTH (SDOH)
DO YOU BELONG TO ANY CLUBS OR ORGANIZATIONS SUCH AS CHURCH GROUPS UNIONS, FRATERNAL OR ATHLETIC GROUPS, OR SCHOOL GROUPS?: NO
HOW OFTEN DO YOU GET TOGETHER WITH FRIENDS OR RELATIVES?: MORE THAN THREE TIMES A WEEK
WITHIN THE LAST YEAR, HAVE YOU BEEN HUMILIATED OR EMOTIONALLY ABUSED IN OTHER WAYS BY YOUR PARTNER OR EX-PARTNER?: NO
HOW OFTEN DO YOU ATTEND CHURCH OR RELIGIOUS SERVICES?: MORE THAN 4 TIMES PER YEAR
WITHIN THE LAST YEAR, HAVE YOU BEEN KICKED, HIT, SLAPPED, OR OTHERWISE PHYSICALLY HURT BY YOUR PARTNER OR EX-PARTNER?: NO
IN A TYPICAL WEEK, HOW MANY TIMES DO YOU TALK ON THE PHONE WITH FAMILY, FRIENDS, OR NEIGHBORS?: MORE THAN THREE TIMES A WEEK
HOW OFTEN DO YOU ATTENT MEETINGS OF THE CLUB OR ORGANIZATION YOU BELONG TO?: NEVER
ARE YOU MARRIED, WIDOWED, DIVORCED, SEPARATED, NEVER MARRIED, OR LIVING WITH A PARTNER?: LIVING WITH PARTNER
WITHIN THE LAST YEAR, HAVE YOU BEEN AFRAID OF YOUR PARTNER OR EX-PARTNER?: NO
WITHIN THE LAST YEAR, HAVE TO BEEN RAPED OR FORCED TO HAVE ANY KIND OF SEXUAL ACTIVITY BY YOUR PARTNER OR EX-PARTNER?: NO

## 2023-06-01 ASSESSMENT — PATIENT HEALTH QUESTIONNAIRE - PHQ9
1. LITTLE INTEREST OR PLEASURE IN DOING THINGS: 0
SUM OF ALL RESPONSES TO PHQ QUESTIONS 1-9: 0
SUM OF ALL RESPONSES TO PHQ9 QUESTIONS 1 & 2: 0
2. FEELING DOWN, DEPRESSED OR HOPELESS: 0
SUM OF ALL RESPONSES TO PHQ QUESTIONS 1-9: 0

## 2023-06-01 NOTE — PROGRESS NOTES
[address]  [address] [facility], [address]  [phone]    Chief Complaint: Shelley Toure is a 20 y.o. Other female , established patient, here for evaluation of the concern(s) above;    SUBJECTIVE:     Pt would like to be evaluated for the problem(s) listed above:     Pt complaints of cough, body aches and nasal congestion for 2 days. Tested positive for covid-19 through home test a day ago. Had fever with Tmax 100.5F which went down to 99F after medications. Breathing has been good. At home remedies tried includes tylenol, mucinex and vitamins. Denies any chest pain, shortness of breath, tachycardia, nausea/vomiting or wheezing. Does not have any history of chronic renal or hepatic disease. Review of systems:       A comprehensive review of systems was negative except for that written in the History of Present Illness. PHYSICAL EXAM:     General: alert, cooperative, no distress   Mental  status: mental status: alert, oriented to person, place, and time, normal mood, behavior, speech, dress, motor activity, and thought processes   Resp: resp: normal effort and no respiratory distress   Neuro: neuro: no gross deficits   Skin: skin: no discoloration or lesions of concern on visible areas   Due to this being a TeleHealth evaluation, many elements of the physical examination are unable to be assessed. ASSESSMENT/PLAN:      Diagnosis Orders   1. COVID-19          1. COVID-19    Offered paxlovid but pt declined as she hates swallowing pills. Will continue conservative management    -Quarantine for 5 days from symptom onset or 5 days from positive test if no symptoms.   - Encouraged ambulation and deep breathing exercises. - Advise rest as needed during the acute illness; for patients without hypoxia, frequent repositioning and ambulation is encouraged.  In addition, we encourage all patients to advance activity as

## 2023-06-01 NOTE — PROGRESS NOTES
Name and Date of Birth Verified    Pharmacy Verified    Chief Complaint   Patient presents with    Positive For Covid-19     Home Kit Test x 1 day     Tuesday evening having stuffy nose 5/30/2023 then the next day started having body aches and fever. Used Tylenol over the counter,Mucinex,Vitamin C and Zinc.    1. \"Have you been to the ER, urgent care clinic since your last visit? Hospitalized since your last visit? \" No    2. \"Have you seen or consulted any other health care providers outside of the [address] [place] [address] since your last visit? \" No     3. For patients aged 39-70: Has the patient had a colonoscopy / FIT/ Cologuard? N/A      If the patient is female:    4. For patients aged 41-77: Has the patient had a mammogram within the past 2 years? No      5. For patients aged 21-65: Has the patient had a pap smear?  Yes  [facility] 1/ 2023    Health Maintenance Due   Topic Date Due    HPV vaccine (1 - 2-dose series) Never done    HIV screen  Never done    DTaP/Tdap/Td vaccine (1 - Tdap) Never done    Pap smear  Never done    COVID-19 Vaccine (4 - Booster for Moderna series) 01/29/2022    Varicella vaccine (2 of 2 - 13+ 2-dose series) 07/28/2022    Depression Screen  02/02/2023

## 2023-06-30 ENCOUNTER — OFFICE VISIT (OUTPATIENT)
Age: 23
End: 2023-06-30
Payer: COMMERCIAL

## 2023-06-30 VITALS
WEIGHT: 232.4 LBS | RESPIRATION RATE: 16 BRPM | TEMPERATURE: 97.8 F | OXYGEN SATURATION: 99 % | DIASTOLIC BLOOD PRESSURE: 60 MMHG | SYSTOLIC BLOOD PRESSURE: 90 MMHG | HEART RATE: 60 BPM | BODY MASS INDEX: 41.18 KG/M2 | HEIGHT: 63 IN

## 2023-06-30 DIAGNOSIS — Z00.00 ANNUAL PHYSICAL EXAM: Primary | ICD-10-CM

## 2023-06-30 DIAGNOSIS — M13.0 POLYARTHRITIS OF HAND: ICD-10-CM

## 2023-06-30 DIAGNOSIS — K21.9 GASTROESOPHAGEAL REFLUX DISEASE, UNSPECIFIED WHETHER ESOPHAGITIS PRESENT: ICD-10-CM

## 2023-06-30 PROCEDURE — 99395 PREV VISIT EST AGE 18-39: CPT | Performed by: STUDENT IN AN ORGANIZED HEALTH CARE EDUCATION/TRAINING PROGRAM

## 2023-06-30 RX ORDER — BIOTIN 10 MG
TABLET ORAL
COMMUNITY

## 2023-06-30 RX ORDER — FAMOTIDINE 40 MG/5ML
20 POWDER, FOR SUSPENSION ORAL 2 TIMES DAILY
Qty: 150 ML | Refills: 2 | Status: SHIPPED | OUTPATIENT
Start: 2023-06-30

## 2023-06-30 RX ORDER — NORETHINDRONE ACETATE AND ETHINYL ESTRADIOL 1; .02 MG/1; MG/1
TABLET ORAL
COMMUNITY

## 2024-05-07 ENCOUNTER — CLINICAL DOCUMENTATION (OUTPATIENT)
Age: 24
End: 2024-05-07

## 2024-05-07 ENCOUNTER — OFFICE VISIT (OUTPATIENT)
Age: 24
End: 2024-05-07
Payer: COMMERCIAL

## 2024-05-07 VITALS
HEART RATE: 65 BPM | BODY MASS INDEX: 36.33 KG/M2 | OXYGEN SATURATION: 99 % | HEIGHT: 63 IN | WEIGHT: 205.03 LBS | RESPIRATION RATE: 18 BRPM | DIASTOLIC BLOOD PRESSURE: 79 MMHG | SYSTOLIC BLOOD PRESSURE: 122 MMHG | TEMPERATURE: 98.1 F

## 2024-05-07 DIAGNOSIS — M13.0 POLYARTHRITIS: ICD-10-CM

## 2024-05-07 DIAGNOSIS — E66.01 CLASS 2 SEVERE OBESITY DUE TO EXCESS CALORIES WITH SERIOUS COMORBIDITY AND BODY MASS INDEX (BMI) OF 36.0 TO 36.9 IN ADULT (HCC): ICD-10-CM

## 2024-05-07 DIAGNOSIS — R73.03 PREDIABETES: ICD-10-CM

## 2024-05-07 DIAGNOSIS — E28.2 PCOS (POLYCYSTIC OVARIAN SYNDROME): Primary | ICD-10-CM

## 2024-05-07 PROCEDURE — 99214 OFFICE O/P EST MOD 30 MIN: CPT | Performed by: STUDENT IN AN ORGANIZED HEALTH CARE EDUCATION/TRAINING PROGRAM

## 2024-05-07 RX ORDER — PHENTERMINE HYDROCHLORIDE 37.5 MG/1
TABLET ORAL
Qty: 30 TABLET | Refills: 2 | Status: SHIPPED | OUTPATIENT
Start: 2024-05-07 | End: 2025-01-23

## 2024-05-07 ASSESSMENT — PATIENT HEALTH QUESTIONNAIRE - PHQ9
SUM OF ALL RESPONSES TO PHQ QUESTIONS 1-9: 0
1. LITTLE INTEREST OR PLEASURE IN DOING THINGS: NOT AT ALL
SUM OF ALL RESPONSES TO PHQ QUESTIONS 1-9: 0
2. FEELING DOWN, DEPRESSED OR HOPELESS: NOT AT ALL
SUM OF ALL RESPONSES TO PHQ9 QUESTIONS 1 & 2: 0

## 2024-05-07 NOTE — PROGRESS NOTES
Chief Complaint   Patient presents with    Follow-up     2023 PCOS      Accompanied by: Mother    \"Have you been to the ER, urgent care clinic since your last visit?  Hospitalized since your last visit?\"    NO    “Have you seen or consulted any other health care providers outside of [facility] since your last visit?”      Yes  2023  GYN  Dr. Stephanie Mars  Yearly     There were no vitals filed for this visit.  Health Maintenance Due   Topic Date Due    Hepatitis B vaccine (1 of 3 - 3-dose series) Never done    HIV screen  Never done    DTaP/Tdap/Td vaccine (1 - Tdap) Never done    HPV vaccine (2 - 3-dose series) 2018    Pap smear  Never done    Varicella vaccine (2 of 2 - 13+ 2-dose series) 2022    COVID-19 Vaccine (2023- season) 2023    Depression Screen  2024      The patient, Honey Cortez, identity was verified by name and , pharmacy verified  Labs:Yes  Fasting:No

## 2024-05-07 NOTE — PROGRESS NOTES
[facility]  [address] [place] [address] [phone]    Chief Complaint: Chronic medical problems    Subjective  Honey Cortez is a 24 y.o. Other female , established patient, here for evaluation of the concern(s) below;    PMHx: PCOS, prediabetes and weight management. Here for follow up    PCOS:    This is a chronic problem.   Was previously on metformin.    Weight Management:  Pt would like medical weight loss management.  Patient's highest weight was 224 pounds.    She was able to lose weight in 2021 after going through a strict diet.  Patient's goal weight is 160 pounds.   The patient's reason for medical weight loss is for overall health.      Patient has been on low calory diet and staying physically active for more than a year without much improvement in weight.    Bariatric co morbidities: prediabetes     Pertinent negatives include  no chest pain, no abdominal pain and no shortness of breath.      Other Health Habits and social history:  Smoking history: no  Alcohol history: socially   Occupation:[employer] in [employer].  Marital status:single and no children.     Mother is Regino Cortez who is also my patient. And Grandmother is Mrs. Cinthia Zuniga (pt at practice as well)     OB/GYN Hx: Follows with OB. On birth control    Allergies - reviewed:   No Known Allergies    Past Medical History - reviewed:  No past medical history on file.    Depression screening:  PHQ-9 Total Score: 0 (5/7/2024 11:20 AM)      Review of systems:   A comprehensive review of systems was negative except for that written in the History of Present Illness.     Physical Exam  /79   Pulse 65   Temp 98.1 °F (36.7 °C) (Tympanic)   Resp 18   Ht 1.6 m (5' 3\")   Wt 93 kg (205 lb 0.4 oz)   LMP 04/28/2024   SpO2 99%   BMI 36.32 kg/m²     General: Alert and oriented, in no acute distress.  SKIN: No rash. No suspicious lesions or moles.  EYE: PERRL. Sclera and

## 2024-05-07 NOTE — PATIENT INSTRUCTIONS
PCOS    We discussed that Metformin is the first-line treatment for PCOS and she will benefit from starting this if all the labs point towards PCOS.  We also discussed the importance of exercise for a goal of 30 minutes/day 5x week to help with weight loss and to try and watch her diet to also help with weight.    - Check total testosterone, prolactin, 17-OH progesterone, DHEA-sulfate  - May benefit from spironolactone in the future for BP control and anti-androgen properties as well as an OCP such as Jennie (has anti-androgren properties) if her periods do not regulate on Metformin    - A1C  - Check Total Testosterone every year to monitor control.

## 2024-05-13 ENCOUNTER — OFFICE VISIT (OUTPATIENT)
Age: 24
End: 2024-05-13

## 2024-05-13 DIAGNOSIS — E66.09 CLASS 2 OBESITY DUE TO EXCESS CALORIES WITH BODY MASS INDEX (BMI) OF 36.0 TO 36.9 IN ADULT, UNSPECIFIED WHETHER SERIOUS COMORBIDITY PRESENT: Primary | ICD-10-CM

## 2024-05-13 DIAGNOSIS — E28.2 PCOS (POLYCYSTIC OVARIAN SYNDROME): ICD-10-CM

## 2024-05-13 DIAGNOSIS — M13.0 POLYARTHRITIS: ICD-10-CM

## 2024-05-13 DIAGNOSIS — R73.03 PREDIABETES: ICD-10-CM

## 2024-05-13 LAB
ALBUMIN SERPL-MCNC: 3.5 G/DL (ref 3.5–5)
ALBUMIN/GLOB SERPL: 0.9 (ref 1.1–2.2)
ALP SERPL-CCNC: 66 U/L (ref 45–117)
ALT SERPL-CCNC: 19 U/L (ref 12–78)
ANION GAP SERPL CALC-SCNC: 7 MMOL/L (ref 5–15)
AST SERPL-CCNC: 13 U/L (ref 15–37)
BASOPHILS # BLD: 0 K/UL (ref 0–0.1)
BASOPHILS NFR BLD: 0 % (ref 0–1)
BILIRUB SERPL-MCNC: 0.2 MG/DL (ref 0.2–1)
BUN SERPL-MCNC: 12 MG/DL (ref 6–20)
BUN/CREAT SERPL: 14 (ref 12–20)
CALCIUM SERPL-MCNC: 9.5 MG/DL (ref 8.5–10.1)
CHLORIDE SERPL-SCNC: 107 MMOL/L (ref 97–108)
CO2 SERPL-SCNC: 24 MMOL/L (ref 21–32)
CREAT SERPL-MCNC: 0.87 MG/DL (ref 0.55–1.02)
CRP SERPL HS-MCNC: 8.2 MG/L
DIFFERENTIAL METHOD BLD: NORMAL
EOSINOPHIL # BLD: 0.2 K/UL (ref 0–0.4)
EOSINOPHIL NFR BLD: 2 % (ref 0–7)
ERYTHROCYTE [DISTWIDTH] IN BLOOD BY AUTOMATED COUNT: 12.9 % (ref 11.5–14.5)
ERYTHROCYTE [SEDIMENTATION RATE] IN BLOOD: 16 MM/HR (ref 0–20)
EST. AVERAGE GLUCOSE BLD GHB EST-MCNC: 100 MG/DL
GLOBULIN SER CALC-MCNC: 3.7 G/DL (ref 2–4)
GLUCOSE SERPL-MCNC: 97 MG/DL (ref 65–100)
HBA1C MFR BLD: 5.1 % (ref 4–5.6)
HCT VFR BLD AUTO: 41.5 % (ref 35–47)
HGB BLD-MCNC: 13.5 G/DL (ref 11.5–16)
IMM GRANULOCYTES # BLD AUTO: 0 K/UL (ref 0–0.04)
IMM GRANULOCYTES NFR BLD AUTO: 0 % (ref 0–0.5)
LYMPHOCYTES # BLD: 2.6 K/UL (ref 0.8–3.5)
LYMPHOCYTES NFR BLD: 32 % (ref 12–49)
MCH RBC QN AUTO: 27.9 PG (ref 26–34)
MCHC RBC AUTO-ENTMCNC: 32.5 G/DL (ref 30–36.5)
MCV RBC AUTO: 85.7 FL (ref 80–99)
MONOCYTES # BLD: 0.4 K/UL (ref 0–1)
MONOCYTES NFR BLD: 5 % (ref 5–13)
NEUTS SEG # BLD: 5 K/UL (ref 1.8–8)
NEUTS SEG NFR BLD: 61 % (ref 32–75)
NRBC # BLD: 0 K/UL (ref 0–0.01)
NRBC BLD-RTO: 0 PER 100 WBC
PLATELET # BLD AUTO: 270 K/UL (ref 150–400)
PMV BLD AUTO: 10.4 FL (ref 8.9–12.9)
POTASSIUM SERPL-SCNC: 4.1 MMOL/L (ref 3.5–5.1)
PROT SERPL-MCNC: 7.2 G/DL (ref 6.4–8.2)
RBC # BLD AUTO: 4.84 M/UL (ref 3.8–5.2)
SODIUM SERPL-SCNC: 138 MMOL/L (ref 136–145)
WBC # BLD AUTO: 8.2 K/UL (ref 3.6–11)

## 2024-05-14 LAB — ANA SER QL: NEGATIVE

## 2024-05-17 LAB — TESTOST SERPL-MCNC: 92.3 NG/DL (ref 10–55)

## 2024-05-22 LAB
14-3-3 ETA AG SER IA-MCNC: <0.2 NG/ML
CCP IGA+IGG SERPL IA-ACNC: <20 UNITS
RHEUMATOID FACT SERPL-ACNC: <14 UNITS/ML

## 2024-06-07 DIAGNOSIS — L20.82 FLEXURAL ECZEMA: Primary | ICD-10-CM

## 2024-06-07 RX ORDER — TRIAMCINOLONE ACETONIDE 5 MG/G
OINTMENT TOPICAL
Qty: 30 G | Refills: 3 | Status: SHIPPED | OUTPATIENT
Start: 2024-06-07 | End: 2024-06-14

## 2024-09-26 ENCOUNTER — OFFICE VISIT (OUTPATIENT)
Age: 24
End: 2024-09-26
Payer: COMMERCIAL

## 2024-09-26 VITALS
SYSTOLIC BLOOD PRESSURE: 108 MMHG | BODY MASS INDEX: 36.64 KG/M2 | TEMPERATURE: 97.6 F | OXYGEN SATURATION: 99 % | HEART RATE: 62 BPM | WEIGHT: 206.79 LBS | DIASTOLIC BLOOD PRESSURE: 71 MMHG | HEIGHT: 63 IN | RESPIRATION RATE: 18 BRPM

## 2024-09-26 DIAGNOSIS — R73.03 PREDIABETES: ICD-10-CM

## 2024-09-26 DIAGNOSIS — E28.2 PCOS (POLYCYSTIC OVARIAN SYNDROME): ICD-10-CM

## 2024-09-26 DIAGNOSIS — Z00.00 ANNUAL PHYSICAL EXAM: Primary | ICD-10-CM

## 2024-09-26 PROCEDURE — 99395 PREV VISIT EST AGE 18-39: CPT | Performed by: STUDENT IN AN ORGANIZED HEALTH CARE EDUCATION/TRAINING PROGRAM

## 2024-09-26 SDOH — ECONOMIC STABILITY: FOOD INSECURITY: WITHIN THE PAST 12 MONTHS, THE FOOD YOU BOUGHT JUST DIDN'T LAST AND YOU DIDN'T HAVE MONEY TO GET MORE.: NEVER TRUE

## 2024-09-26 SDOH — ECONOMIC STABILITY: FOOD INSECURITY: WITHIN THE PAST 12 MONTHS, YOU WORRIED THAT YOUR FOOD WOULD RUN OUT BEFORE YOU GOT MONEY TO BUY MORE.: NEVER TRUE

## 2024-09-26 SDOH — ECONOMIC STABILITY: INCOME INSECURITY: HOW HARD IS IT FOR YOU TO PAY FOR THE VERY BASICS LIKE FOOD, HOUSING, MEDICAL CARE, AND HEATING?: NOT HARD AT ALL

## 2024-11-04 ENCOUNTER — TELEPHONE (OUTPATIENT)
Age: 24
End: 2024-11-04

## 2024-11-04 NOTE — TELEPHONE ENCOUNTER
Patient requested to be seen today for leg pain that moves from front to back. Patient can be reached at [phone].

## 2024-11-04 NOTE — TELEPHONE ENCOUNTER
Patient had stated she spoke with someone earlier and was told there was no availability. Had a last minute cancellation @ 12:20 pm but patient stated she is already at [facility] and will follow with [facility] at a later date.

## 2025-07-22 ENCOUNTER — APPOINTMENT (OUTPATIENT)
Facility: HOSPITAL | Age: 25
End: 2025-07-22
Payer: COMMERCIAL

## 2025-07-22 ENCOUNTER — HOSPITAL ENCOUNTER (EMERGENCY)
Facility: HOSPITAL | Age: 25
Discharge: HOME | End: 2025-07-22
Attending: EMERGENCY MEDICINE
Payer: COMMERCIAL

## 2025-07-22 VITALS
DIASTOLIC BLOOD PRESSURE: 78 MMHG | WEIGHT: 215.39 LBS | SYSTOLIC BLOOD PRESSURE: 128 MMHG | BODY MASS INDEX: 38.16 KG/M2 | RESPIRATION RATE: 16 BRPM | HEART RATE: 67 BPM | TEMPERATURE: 99 F | HEIGHT: 63 IN | OXYGEN SATURATION: 98 %

## 2025-07-22 DIAGNOSIS — R07.89 ATYPICAL CHEST PAIN: Primary | ICD-10-CM

## 2025-07-22 LAB
ALBUMIN SERPL-MCNC: 4 G/DL (ref 3.5–5)
ALBUMIN/GLOB SERPL: 0.9 (ref 1.1–2.2)
ALP SERPL-CCNC: 58 U/L (ref 45–117)
ALT SERPL-CCNC: 35 U/L (ref 12–78)
ANION GAP SERPL CALC-SCNC: 9 MMOL/L (ref 2–12)
AST SERPL-CCNC: 20 U/L (ref 15–37)
BASOPHILS # BLD: 0.05 K/UL (ref 0–0.1)
BASOPHILS NFR BLD: 0.5 % (ref 0–1)
BILIRUB SERPL-MCNC: 0.4 MG/DL (ref 0.2–1)
BUN SERPL-MCNC: 11 MG/DL (ref 6–20)
BUN/CREAT SERPL: 13 (ref 12–20)
CALCIUM SERPL-MCNC: 9.5 MG/DL (ref 8.5–10.1)
CHLORIDE SERPL-SCNC: 104 MMOL/L (ref 97–108)
CO2 SERPL-SCNC: 24 MMOL/L (ref 21–32)
COMMENT:: NORMAL
CREAT SERPL-MCNC: 0.83 MG/DL (ref 0.55–1.02)
D DIMER PPP FEU-MCNC: 0.33 MG/L FEU (ref 0–0.65)
DIFFERENTIAL METHOD BLD: ABNORMAL
EOSINOPHIL # BLD: 0.14 K/UL (ref 0–0.4)
EOSINOPHIL NFR BLD: 1.5 % (ref 0–7)
ERYTHROCYTE [DISTWIDTH] IN BLOOD BY AUTOMATED COUNT: 12.5 % (ref 11.5–14.5)
GLOBULIN SER CALC-MCNC: 4.4 G/DL (ref 2–4)
GLUCOSE SERPL-MCNC: 106 MG/DL (ref 65–100)
HCG UR QL: NEGATIVE
HCT VFR BLD AUTO: 43.2 % (ref 35–47)
HGB BLD-MCNC: 13.9 G/DL (ref 11.5–16)
IMM GRANULOCYTES # BLD AUTO: 0.03 K/UL (ref 0–0.04)
IMM GRANULOCYTES NFR BLD AUTO: 0.3 % (ref 0–0.5)
LYMPHOCYTES # BLD: 3.48 K/UL (ref 0.8–3.5)
LYMPHOCYTES NFR BLD: 37.1 % (ref 12–49)
MAGNESIUM SERPL-MCNC: 2.2 MG/DL (ref 1.6–2.4)
MCH RBC QN AUTO: 28.4 PG (ref 26–34)
MCHC RBC AUTO-ENTMCNC: 32.2 G/DL (ref 30–36.5)
MCV RBC AUTO: 88.3 FL (ref 80–99)
MONOCYTES # BLD: 0.45 K/UL (ref 0–1)
MONOCYTES NFR BLD: 4.8 % (ref 5–13)
NEUTS SEG # BLD: 5.24 K/UL (ref 1.8–8)
NEUTS SEG NFR BLD: 55.8 % (ref 32–75)
NRBC # BLD: 0 K/UL (ref 0–0.01)
NRBC BLD-RTO: 0 PER 100 WBC
PLATELET # BLD AUTO: 290 K/UL (ref 150–400)
PMV BLD AUTO: 9.5 FL (ref 8.9–12.9)
POTASSIUM SERPL-SCNC: 3.5 MMOL/L (ref 3.5–5.1)
PROT SERPL-MCNC: 8.4 G/DL (ref 6.4–8.2)
RBC # BLD AUTO: 4.89 M/UL (ref 3.8–5.2)
SODIUM SERPL-SCNC: 137 MMOL/L (ref 136–145)
SPECIMEN HOLD: NORMAL
TROPONIN I SERPL HS-MCNC: 5 NG/L (ref 0–51)
WBC # BLD AUTO: 9.4 K/UL (ref 3.6–11)

## 2025-07-22 PROCEDURE — 80053 COMPREHEN METABOLIC PANEL: CPT

## 2025-07-22 PROCEDURE — 6360000004 HC RX CONTRAST MEDICATION: Performed by: RADIOLOGY

## 2025-07-22 PROCEDURE — 83735 ASSAY OF MAGNESIUM: CPT

## 2025-07-22 PROCEDURE — 85025 COMPLETE CBC W/AUTO DIFF WBC: CPT

## 2025-07-22 PROCEDURE — 99285 EMERGENCY DEPT VISIT HI MDM: CPT

## 2025-07-22 PROCEDURE — 84484 ASSAY OF TROPONIN QUANT: CPT

## 2025-07-22 PROCEDURE — 81025 URINE PREGNANCY TEST: CPT

## 2025-07-22 PROCEDURE — 85379 FIBRIN DEGRADATION QUANT: CPT

## 2025-07-22 PROCEDURE — 93005 ELECTROCARDIOGRAM TRACING: CPT | Performed by: EMERGENCY MEDICINE

## 2025-07-22 PROCEDURE — 71275 CT ANGIOGRAPHY CHEST: CPT

## 2025-07-22 RX ORDER — IOPAMIDOL 755 MG/ML
100 INJECTION, SOLUTION INTRAVASCULAR
Status: COMPLETED | OUTPATIENT
Start: 2025-07-22 | End: 2025-07-22

## 2025-07-22 RX ADMIN — IOPAMIDOL 80 ML: 755 INJECTION, SOLUTION INTRAVENOUS at 17:20

## 2025-07-22 ASSESSMENT — PAIN DESCRIPTION - ORIENTATION
ORIENTATION: LEFT
ORIENTATION: LEFT

## 2025-07-22 ASSESSMENT — PAIN DESCRIPTION - FREQUENCY
FREQUENCY: INTERMITTENT
FREQUENCY: CONTINUOUS

## 2025-07-22 ASSESSMENT — PAIN DESCRIPTION - PAIN TYPE
TYPE: ACUTE PAIN
TYPE: ACUTE PAIN

## 2025-07-22 ASSESSMENT — PAIN DESCRIPTION - ONSET
ONSET: ON-GOING
ONSET: ON-GOING

## 2025-07-22 ASSESSMENT — PAIN SCALES - GENERAL
PAINLEVEL_OUTOF10: 5
PAINLEVEL_OUTOF10: 10

## 2025-07-22 ASSESSMENT — PAIN - FUNCTIONAL ASSESSMENT
PAIN_FUNCTIONAL_ASSESSMENT: 0-10
PAIN_FUNCTIONAL_ASSESSMENT: ACTIVITIES ARE NOT PREVENTED
PAIN_FUNCTIONAL_ASSESSMENT: ACTIVITIES ARE NOT PREVENTED

## 2025-07-22 ASSESSMENT — PAIN DESCRIPTION - DESCRIPTORS
DESCRIPTORS: PRESSURE
DESCRIPTORS: ACHING

## 2025-07-22 ASSESSMENT — PAIN DESCRIPTION - LOCATION
LOCATION: CHEST
LOCATION: CHEST

## 2025-07-22 NOTE — DISCHARGE INSTRUCTIONS
Today your lab work was reassuring with no signs of emergent process at this time. Please follow up with your primary care to discuss todays visit within 1 week. If your symptoms change or worsen please do not hesitate to return to the ED.

## 2025-07-22 NOTE — ED TRIAGE NOTES
Patient ambulatory through triage with complaints of x1 week of left chest pressure and intermittent left sided sharp pain. Recent hx of travel to [place] from July 01-08. Takes daily hormonal birth control, denies smoking hx.

## 2025-07-22 NOTE — ED PROVIDER NOTES
[facility] EMERGENCY DEPARTMENT  EMERGENCY DEPARTMENT ENCOUNTER      Pt Name: Honey Cortez  MRN: [id number]  Birthdate 2000  Date of evaluation: 7/22/2025  Provider: Ivette Patton PA-C    CHIEF COMPLAINT       Chief Complaint   Patient presents with    Chest Pain         HISTORY OF PRESENT ILLNESS   (Location/Symptom, Timing/Onset, Context/Setting, Quality, Duration, Modifying Factors, Severity)  Note limiting factors.   26 yo female h/o PCOS presenting with concerns of intermittent left-sided chest pain that has been present for the past week.  Patient states that the pain is sharp and comes and goes and is not positional.  She does endorse taking oral hormone birth control and that she had a long international flight at the beginning of this month.  She denies any shortness of breath, extremity numbness, weakness, headache, dizziness, cough, fevers.            Review of External Medical Records:     Nursing Notes were reviewed.    REVIEW OF SYSTEMS    (2-9 systems for level 4, 10 or more for level 5)     Review of Systems    Except as noted above the remainder of the review of systems was reviewed and negative.       PAST MEDICAL HISTORY   No past medical history on file.      SURGICAL HISTORY     No past surgical history on file.      CURRENT MEDICATIONS       Discharge Medication List as of 7/22/2025  7:52 PM        CONTINUE these medications which have NOT CHANGED    Details   metFORMIN (GLUCOPHAGE) 500 MG tablet Take 1 tablet by mouth 2 times daily (with meals), Disp-180 tablet, R-1NO PRINT      Multiple Vitamins-Minerals (MULTIVITAMIN ADULT EXTRA C) CHEW Take by mouthHistorical Med      norethindrone-ethinyl estradiol (MICROGESTIN 1/20) 1-20 MG-MCG per tablet Junel 1/20 (21) 1 mg-20 mcg tablet   TAKE 1 TABLET BY MOUTH EVERY DAY AT THE SAME TIMEHistorical Med      famotidine (PEPCID) 40 MG/5ML suspension Take 2.5 mLs by mouth 2 times daily, Disp-150 mL, R-2Normal      Cholecalciferol (VITAMIN D3

## 2025-07-23 LAB
EKG ATRIAL RATE: 80 BPM
EKG DIAGNOSIS: NORMAL
EKG P AXIS: 43 DEGREES
EKG P-R INTERVAL: 150 MS
EKG Q-T INTERVAL: 384 MS
EKG QRS DURATION: 94 MS
EKG QTC CALCULATION (BAZETT): 442 MS
EKG R AXIS: 58 DEGREES
EKG T AXIS: 48 DEGREES
EKG VENTRICULAR RATE: 80 BPM

## 2025-07-23 PROCEDURE — 93010 ELECTROCARDIOGRAM REPORT: CPT | Performed by: SPECIALIST

## 2025-07-23 NOTE — ED NOTES
Patient discharged by Abdi Walton pt sent to the front lobby, with strong and steady gait, no acute distress noted at time of discharge - Discharge information / home RX / and reasons to return to the ED were reviewed by the ED provider.